# Patient Record
Sex: FEMALE | NOT HISPANIC OR LATINO | URBAN - METROPOLITAN AREA
[De-identification: names, ages, dates, MRNs, and addresses within clinical notes are randomized per-mention and may not be internally consistent; named-entity substitution may affect disease eponyms.]

---

## 2023-07-31 ENCOUNTER — LAB OUTSIDE AN ENCOUNTER (OUTPATIENT)
Dept: URBAN - METROPOLITAN AREA CLINIC 88 | Facility: CLINIC | Age: 43
End: 2023-07-31

## 2023-07-31 ENCOUNTER — WEB ENCOUNTER (OUTPATIENT)
Dept: URBAN - METROPOLITAN AREA CLINIC 88 | Facility: CLINIC | Age: 43
End: 2023-07-31

## 2023-07-31 ENCOUNTER — OFFICE VISIT (OUTPATIENT)
Dept: URBAN - METROPOLITAN AREA CLINIC 88 | Facility: CLINIC | Age: 43
End: 2023-07-31
Payer: MEDICAID

## 2023-07-31 VITALS
WEIGHT: 165.2 LBS | TEMPERATURE: 98.1 F | HEIGHT: 66 IN | SYSTOLIC BLOOD PRESSURE: 220 MMHG | BODY MASS INDEX: 26.55 KG/M2 | HEART RATE: 88 BPM | DIASTOLIC BLOOD PRESSURE: 151 MMHG

## 2023-07-31 DIAGNOSIS — Z80.0 FAMILY HISTORY OF COLON CANCER: ICD-10-CM

## 2023-07-31 DIAGNOSIS — Z86.010 PERSONAL HISTORY OF COLONIC POLYPS: ICD-10-CM

## 2023-07-31 DIAGNOSIS — R14.0 BLOATING: ICD-10-CM

## 2023-07-31 DIAGNOSIS — Z86.79 HISTORY OF HYPERTENSION: ICD-10-CM

## 2023-07-31 DIAGNOSIS — K58.1 IRRITABLE BOWEL SYNDROME WITH CONSTIPATION: ICD-10-CM

## 2023-07-31 PROBLEM — 440630006: Status: ACTIVE | Noted: 2023-07-31

## 2023-07-31 PROBLEM — 428283002: Status: ACTIVE | Noted: 2023-07-31

## 2023-07-31 PROCEDURE — 99204 OFFICE O/P NEW MOD 45 MIN: CPT | Performed by: NURSE PRACTITIONER

## 2023-07-31 RX ORDER — CLONIDINE HYDROCHLORIDE 0.1 MG/1
1 TABLET TABLET ORAL ONCE A DAY
Status: ACTIVE | COMMUNITY

## 2023-07-31 NOTE — HPI-TODAY'S VISIT:
Presents today to evaluate and manage constipation.  Voices long hx of constipation since childhood.  Without medication, defecation occurs once every 14 days with stool consistency described as being loose.  Fails to achieve a complete sense of evacuation.  Once menstrual cycle occurs, experiences more complete BMs but still fails to achieve a complete sense of evacuation.  Voices intermittent abdominal cramping, bloating and flatulence with constipation.  These symptoms lessen or improve during her menstrual cycle days.  Denies signs of rectal bleeding, unexplained weight loss or melena.   Has tried and failed MiraLAX, fiber supplements, OTC laxatives (Dulcolax), magnesium citrate, stool softeners and enemas over the years to manage her constipation.  Previously evaluated by [facility] over 5 years ago and started on prescription medication (unsure of name) to manage constipation.  Colonoscopy was also performed with polyps removed.  Voices family hx of colon cancer in [place].   Of note, voices hx of hypertension since CVA.  Taking clonidine as directed leads to normal BP range for several "hours" before elevated BP reading return.  However, states taking clonidine twice a day as directed leads to somnolence.  As result, takes medication once at night.  Denies evaluation by cardiologist and has not establish primary care locally since moving to her current address.  Evaluated by neurologist after CVA.

## 2023-08-08 ENCOUNTER — OFFICE VISIT (OUTPATIENT)
Dept: URBAN - METROPOLITAN AREA CLINIC 88 | Facility: CLINIC | Age: 43
End: 2023-08-08

## 2023-09-05 ENCOUNTER — OFFICE VISIT (OUTPATIENT)
Dept: URBAN - METROPOLITAN AREA CLINIC 88 | Facility: CLINIC | Age: 43
End: 2023-09-05

## 2023-09-05 RX ORDER — CLONIDINE HYDROCHLORIDE 0.1 MG/1
1 TABLET TABLET ORAL ONCE A DAY
Status: ACTIVE | COMMUNITY

## 2023-09-28 ENCOUNTER — OFFICE VISIT (OUTPATIENT)
Dept: URBAN - METROPOLITAN AREA CLINIC 88 | Facility: CLINIC | Age: 43
End: 2023-09-28

## 2023-09-28 RX ORDER — CLONIDINE HYDROCHLORIDE 0.1 MG/1
1 TABLET TABLET ORAL ONCE A DAY
Status: ACTIVE | COMMUNITY

## 2023-09-28 NOTE — HPI-TODAY'S VISIT:
Presents today for follow up regarding IBS-C.  Provided samples of Linzess 290 mcg at LOV.    Referred to cardiology for management of HTN.

## 2023-09-28 NOTE — HPI-OTHER HISTORIES
-------------------------------------------------------------------------- Last office note 07/31/2023: Presents today to evaluate and manage constipation.  Voices long hx of constipation since childhood.  Without medication, defecation occurs once every 14 days with stool consistency described as being loose.  Fails to achieve a complete sense of evacuation.  Once menstrual cycle occurs, experiences more complete BMs but still fails to achieve a complete sense of evacuation.  Voices intermittent abdominal cramping, bloating and flatulence with constipation.  These symptoms lessen or improve during her menstrual cycle days.  Denies signs of rectal bleeding, unexplained weight loss or melena.   Has tried and failed MiraLAX, fiber supplements, OTC laxatives (Dulcolax), magnesium citrate, stool softeners and enemas over the years to manage her constipation.  Previously evaluated by [facility] over 5 years ago and started on prescription medication (unsure of name) to manage constipation.  Colonoscopy was also performed with polyps removed.  Voices family hx of colon cancer in [facility].   Of note, voices hx of hypertension since CVA.  Taking clonidine as directed leads to normal BP range for several "hours" before elevated BP reading return.  However, states taking clonidine twice a day as directed leads to somnolence.  As result, takes medication once at night.  Denies evaluation by cardiologist and has not establish primary care locally since moving to her current address.  Evaluated by neurologist after CVA.

## 2023-10-16 ENCOUNTER — OFFICE VISIT (OUTPATIENT)
Dept: URBAN - METROPOLITAN AREA CLINIC 88 | Facility: CLINIC | Age: 43
End: 2023-10-16

## 2023-10-16 RX ORDER — CLONIDINE HYDROCHLORIDE 0.1 MG/1
1 TABLET TABLET ORAL ONCE A DAY
Status: ACTIVE | COMMUNITY

## 2023-11-01 ENCOUNTER — OFFICE VISIT (OUTPATIENT)
Dept: URBAN - METROPOLITAN AREA CLINIC 88 | Facility: CLINIC | Age: 43
End: 2023-11-01

## 2023-11-01 RX ORDER — CLONIDINE HYDROCHLORIDE 0.1 MG/1
1 TABLET TABLET ORAL ONCE A DAY
Status: ACTIVE | COMMUNITY

## 2023-11-13 ENCOUNTER — DASHBOARD ENCOUNTERS (OUTPATIENT)
Age: 43
End: 2023-11-13

## 2023-11-15 ENCOUNTER — OFFICE VISIT (OUTPATIENT)
Dept: URBAN - METROPOLITAN AREA CLINIC 88 | Facility: CLINIC | Age: 43
End: 2023-11-15

## 2023-11-15 RX ORDER — CLONIDINE HYDROCHLORIDE 0.1 MG/1
1 TABLET TABLET ORAL ONCE A DAY
Status: ACTIVE | COMMUNITY

## 2023-11-15 NOTE — HPI-OTHER HISTORIES
-------------------------------------------------------------------------- Last office note 07/31/2023: Presents today to evaluate and manage constipation.  Voices long hx of constipation since childhood.  Without medication, defecation occurs once every 14 days with stool consistency described as being loose.  Fails to achieve a complete sense of evacuation.  Once menstrual cycle occurs, experiences more complete BMs but still fails to achieve a complete sense of evacuation.  Voices intermittent abdominal cramping, bloating and flatulence with constipation.  These symptoms lessen or improve during her menstrual cycle days.  Denies signs of rectal bleeding, unexplained weight loss or melena.   Has tried and failed MiraLAX, fiber supplements, OTC laxatives (Dulcolax), magnesium citrate, stool softeners and enemas over the years to manage her constipation.  Previously evaluated by [facility] over 5 years ago and started on prescription medication (unsure of name) to manage constipation.  Colonoscopy was also performed with polyps removed.  Voices family hx of colon cancer in [facility] [place].   Of note, voices hx of hypertension since CVA.  Taking clonidine as directed leads to normal BP range for several "hours" before elevated BP reading return.  However, states taking clonidine twice a day as directed leads to somnolence.  As result, takes medication once at night.  Denies evaluation by cardiologist and has not establish primary care locally since moving to her current address.  Evaluated by neurologist after CVA.